# Patient Record
Sex: FEMALE
[De-identification: names, ages, dates, MRNs, and addresses within clinical notes are randomized per-mention and may not be internally consistent; named-entity substitution may affect disease eponyms.]

---

## 2017-05-15 ENCOUNTER — HOSPITAL ENCOUNTER (OUTPATIENT)
Dept: HOSPITAL 5 - SPVIMAG | Age: 82
Discharge: HOME | End: 2017-05-15
Attending: INTERNAL MEDICINE
Payer: MEDICARE
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
## 2017-05-15 DIAGNOSIS — S52.501A: Primary | ICD-10-CM

## 2017-05-15 DIAGNOSIS — M85.48: ICD-10-CM
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
## 2017-05-15 DIAGNOSIS — M13.841: ICD-10-CM

## 2017-05-15 DIAGNOSIS — M13.831: ICD-10-CM

## 2017-05-15 NOTE — XRAY REPORT
Right wrist, right-hand:



Trauma, pain.



There is a transverse fracture with slight separation of fragments 

involving the distal radius. There is a slightly displaced posterior 

fragment. Calcified cartilage of is identified in the proximal row of 

carpal bones. Degenerative changes also involve the greater multangular 

bone with significant narrowing of the articulation with the first 

metacarpal.



There is no fracture of the hand however there are significant 

degenerative changes predominantly affecting the third MP joint as well 

as the second through fifth DIP joints and fifth PIP joint. There is a 

subchondral cyst of the proximal phalanx involving the second PIP joint 

as well as the base of the fifth metacarpal. The bones are generally 

demineralized in the wrist and hand.



Impressions:



1. Radius fracture.



2. 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

## 2019-01-29 ENCOUNTER — HOSPITAL ENCOUNTER (OUTPATIENT)
Dept: HOSPITAL 5 - XRAY | Age: 84
Discharge: HOME | End: 2019-01-29
Attending: INTERNAL MEDICINE
Payer: MEDICARE

## 2019-01-29 DIAGNOSIS — M85.88: Primary | ICD-10-CM

## 2019-01-29 NOTE — XRAY REPORT
LEFT HIP, 2 views:



History: Left hip pain, chronic



Osteopenia is evident. There is normal articulation of the left hip. No 

evidence for fracture, osteonecrosis or dislocation. No significant 

degenerative changes are identified.



IMPRESSION:

Osteopenia. No significant joint pathology.